# Patient Record
Sex: FEMALE | Race: WHITE
[De-identification: names, ages, dates, MRNs, and addresses within clinical notes are randomized per-mention and may not be internally consistent; named-entity substitution may affect disease eponyms.]

---

## 2018-05-31 ENCOUNTER — HOSPITAL ENCOUNTER (EMERGENCY)
Dept: HOSPITAL 61 - ER | Age: 18
Discharge: HOME | End: 2018-05-31
Payer: COMMERCIAL

## 2018-05-31 DIAGNOSIS — H66.91: Primary | ICD-10-CM

## 2018-05-31 DIAGNOSIS — Z88.0: ICD-10-CM

## 2018-05-31 PROCEDURE — 99283 EMERGENCY DEPT VISIT LOW MDM: CPT

## 2019-09-02 ENCOUNTER — HOSPITAL ENCOUNTER (EMERGENCY)
Dept: HOSPITAL 61 - ER | Age: 19
Discharge: HOME | End: 2019-09-02
Payer: COMMERCIAL

## 2019-09-02 VITALS — HEIGHT: 65 IN | WEIGHT: 190 LBS | BODY MASS INDEX: 31.65 KG/M2

## 2019-09-02 VITALS — DIASTOLIC BLOOD PRESSURE: 71 MMHG | SYSTOLIC BLOOD PRESSURE: 130 MMHG
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
## 2019-09-02 DIAGNOSIS — S93.602A: Primary | ICD-10-CM
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
## 2019-09-02 DIAGNOSIS — Z88.0: ICD-10-CM
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
## 2019-09-02 PROCEDURE — 73630 X-RAY EXAM OF FOOT: CPT

## 2019-09-02 PROCEDURE — 99284 EMERGENCY DEPT VISIT MOD MDM: CPT

## 2019-09-02 PROCEDURE — 96372 THER/PROPH/DIAG INJ SC/IM: CPT

## 2019-09-02 NOTE — PHYS DOC
Past Medical History


Past Medical History:  No Pertinent History


Past Surgical History:  No Surgical History


Additional Information:  


Nonsmoker


Alcohol Use:  None


Drug Use:  None





Adult General


Chief Complaint


Chief Complaint:  FOOT INJURY PAIN





HPI


HPI


20 y/o female presents with sudden left foot pain which started at 1600 this 

evening. Patient reports she has been on her feet for a "double shift "as a 

. Patient reports inability to walk due to stabbing pain. Denies known 

trauma. Denies rash. Patient reports she feels as if her foot is swollen. Denies

pregnancy.





Review of Systems


Review of Systems


Constitutional: Denies fever or chills 


Eyes: Denies redness or eye pain 


HENT: Denies nasal congestion or sore throat


Respiratory: Denies cough or shortness of breath 


Cardiovascular: Denies chest pain or palpitations


GI: Denies abdominal pain, nausea, or vomiting


: Denies dysuria or hematuria


Musculoskeletal: Denies back pain; reports left foot pain


Integument: Denies rash or skin lesions 


Neurologic: Denies headache, focal weakness or sensory changes





Complete systems were reviewed and found to be within normal limits, except as 

documented in this note.





Current Medications


Current Medications





Current Medications








 Medications


  (Trade)  Dose


 Ordered  Sig/Meka  Start Time


 Stop Time Status Last Admin


Dose Admin


 


 Dexamethasone


  (Decadron)  10 mg  1X  ONCE  9/2/19 03:00


 9/2/19 03:01  9/2/19 02:45


10 MG


 


 Ketorolac


 Tromethamine


  (Toradol 30mg


 Vial)  30 mg  1X  ONCE  9/2/19 03:00


 9/2/19 03:01  9/2/19 02:44


30 MG











Allergies


Allergies





Allergies








Coded Allergies Type Severity Reaction Last Updated Verified


 


  Penicillins Allergy Intermediate  5/31/18 Yes











Physical Exam


Physical Exam


Constitutional: Well developed, well nourished, no acute distress, non-toxic 

appearance


HENT: Normocephalic, atraumatic, oropharynx moist


Eyes: Conjunctiva normal, no discharge


Neck: Normal range of motion, no tenderness, supple


Cardiovascular:  Left foot DP and PT +2, CR < 2 sec


Lungs & Thorax:  No respiratory distress,


Skin: Warm, dry, no erythema, no rash


Extremities: Tenderness to palpation of left 1st and 2nd metatarsals, no 

deformity


Neurologic: Alert and oriented X 3, speech normal


Psychologic: Affect normal, judgement normal





Current Patient Data


Vital Signs





                                   Vital Signs








  Date Time  Temp Pulse Resp B/P (MAP) Pulse Ox O2 Delivery O2 Flow Rate FiO2


 


9/2/19 02:19 97.8 91 20 130/71 (90) 98 Room Air  





 97.8       











EKG


EKG


[]





Radiology/Procedures


Radiology/Procedures


LEFT FOOT XR 3 VIEWS (preliminary interpretation by ED physician): No acute 

fracture/dislocation





Course & Med Decision Making


Course & Med Decision Making


Pertinent Imaging studies reviewed. (See chart for details)





Patient presents with left foot pain without known trauma. No deformity noted. 

Neurovascularly intact. Pain primarily to first and second metatarsal. Ice 

applied. Pain addressed. X-ray obtained without acute fracture or dislocation. 

Ace bandage, postop shoe, and crutches provided with training.Patient stable for

 discharge with outpatient follow-up with PCP/orthopedics. Orthopedic referral 

provided. Discussed findings and plan with patient and family, who acknowledge 

understanding and agreement.





Dragon Disclaimer


Dragon Disclaimer


This electronic medical record was generated, in whole or in part, using a voice

 recognition dictation system.





Splinting


Splinting :  


   Location:  left foot


   Pre-Made Type:  Ace bandage and Post op shoe


   Pre-Proc Neuro Vasc Exam:  normal


   Post-Proc Neuro Vasc Exam:  normal, unchanged from pre-exam





Departure


Departure


Impression:  


   Primary Impression:  


   Sprain of foot, left


Disposition:  01 HOME, SELF-CARE


Condition:  STABLE


Referrals:  


UNKNOWN PCP NAME (PCP)








KASIE ALEXANDER MD


Patient Instructions:  Crutch Use, Easy-to-Read, Foot Sprain





Additional Instructions:  


Use over the counter Ibuprofen and Tylenol.  ICE area 20 min on and then off for

 next 20 min as needed for next few days.





Problem Qualifiers








   Primary Impression:  


   Sprain of foot, left


   Encounter type:  initial encounter  Qualified Codes:  S93.602A - Unspecified 

   sprain of left foot, initial encounter








MONY WILBURN DO              Sep 2, 2019 02:52

## 2019-09-02 NOTE — RAD
Left foot 3 views.

 

HISTORY: Pain

 

3 views were taken of the left foot. There is not evidence of an acute 

fracture or osseous abnormality.

 

IMPRESSION:

1. Negative left foot.

 

Electronically signed by: Jose Ham MD (9/2/2019 5:15 AM) [id number]